# Patient Record
Sex: FEMALE | HISPANIC OR LATINO | URBAN - METROPOLITAN AREA
[De-identification: names, ages, dates, MRNs, and addresses within clinical notes are randomized per-mention and may not be internally consistent; named-entity substitution may affect disease eponyms.]

---

## 2017-08-08 ENCOUNTER — OFFICE VISIT - HEALTHEAST (OUTPATIENT)
Dept: INTERNAL MEDICINE | Facility: CLINIC | Age: 37
End: 2017-08-08

## 2017-08-08 DIAGNOSIS — F41.9 ANXIETY: ICD-10-CM

## 2017-08-08 DIAGNOSIS — M25.60 MULTIPLE STIFF JOINTS: ICD-10-CM

## 2017-08-08 DIAGNOSIS — F43.10 PTSD (POST-TRAUMATIC STRESS DISORDER): ICD-10-CM

## 2017-08-08 DIAGNOSIS — N23 KIDNEY PAIN: ICD-10-CM

## 2017-08-08 DIAGNOSIS — Z86.39 H/O IRON DEFICIENCY: ICD-10-CM

## 2017-08-08 DIAGNOSIS — F32.A DEPRESSION: ICD-10-CM

## 2017-08-10 ENCOUNTER — COMMUNICATION - HEALTHEAST (OUTPATIENT)
Dept: INTERNAL MEDICINE | Facility: CLINIC | Age: 37
End: 2017-08-10

## 2017-08-10 ENCOUNTER — COMMUNICATION - HEALTHEAST (OUTPATIENT)
Dept: INFUSION THERAPY | Facility: HOSPITAL | Age: 37
End: 2017-08-10

## 2017-08-10 ENCOUNTER — AMBULATORY - HEALTHEAST (OUTPATIENT)
Dept: INTERNAL MEDICINE | Facility: CLINIC | Age: 37
End: 2017-08-10

## 2017-08-10 DIAGNOSIS — D50.9 IRON DEFICIENCY ANEMIA: ICD-10-CM

## 2017-08-10 DIAGNOSIS — D50.9 IRON DEFICIENCY ANEMIA, UNSPECIFIED IRON DEFICIENCY ANEMIA TYPE: ICD-10-CM

## 2017-08-10 LAB — ANA SER QL: 0.2 U

## 2017-08-15 ENCOUNTER — COMMUNICATION - HEALTHEAST (OUTPATIENT)
Dept: INFUSION THERAPY | Facility: HOSPITAL | Age: 37
End: 2017-08-15

## 2017-12-27 ENCOUNTER — COMMUNICATION - HEALTHEAST (OUTPATIENT)
Dept: INTERNAL MEDICINE | Facility: CLINIC | Age: 37
End: 2017-12-27

## 2018-01-26 ENCOUNTER — COMMUNICATION - HEALTHEAST (OUTPATIENT)
Dept: SCHEDULING | Facility: CLINIC | Age: 38
End: 2018-01-26

## 2018-02-07 ENCOUNTER — RECORDS - HEALTHEAST (OUTPATIENT)
Dept: ADMINISTRATIVE | Facility: OTHER | Age: 38
End: 2018-02-07

## 2019-04-09 ENCOUNTER — OFFICE VISIT - HEALTHEAST (OUTPATIENT)
Dept: FAMILY MEDICINE | Facility: CLINIC | Age: 39
End: 2019-04-09

## 2019-04-09 DIAGNOSIS — F32.1 MODERATE MAJOR DEPRESSION (H): ICD-10-CM

## 2019-04-09 LAB
ANION GAP SERPL CALCULATED.3IONS-SCNC: 10 MMOL/L (ref 5–18)
BUN SERPL-MCNC: 15 MG/DL (ref 8–22)
CALCIUM SERPL-MCNC: 9.2 MG/DL (ref 8.5–10.5)
CHLORIDE BLD-SCNC: 106 MMOL/L (ref 98–107)
CO2 SERPL-SCNC: 21 MMOL/L (ref 22–31)
CREAT SERPL-MCNC: 0.69 MG/DL (ref 0.6–1.1)
ERYTHROCYTE [DISTWIDTH] IN BLOOD BY AUTOMATED COUNT: 20.7 % (ref 11–14.5)
GFR SERPL CREATININE-BSD FRML MDRD: >60 ML/MIN/1.73M2
GLUCOSE BLD-MCNC: 89 MG/DL (ref 70–125)
HCT VFR BLD AUTO: 34.9 % (ref 35–47)
HGB BLD-MCNC: 10 G/DL (ref 12–16)
MCH RBC QN AUTO: 19.5 PG (ref 27–34)
MCHC RBC AUTO-ENTMCNC: 28.7 G/DL (ref 32–36)
MCV RBC AUTO: 68 FL (ref 80–100)
PLATELET # BLD AUTO: 526 THOU/UL (ref 140–440)
PMV BLD AUTO: 9.7 FL (ref 8.5–12.5)
POTASSIUM BLD-SCNC: 4.6 MMOL/L (ref 3.5–5)
RBC # BLD AUTO: 5.13 MILL/UL (ref 3.8–5.4)
SODIUM SERPL-SCNC: 137 MMOL/L (ref 136–145)
TSH SERPL DL<=0.005 MIU/L-ACNC: 0.74 UIU/ML (ref 0.3–5)
WBC: 5.7 THOU/UL (ref 4–11)

## 2019-04-09 RX ORDER — SERTRALINE HYDROCHLORIDE 25 MG/1
25 TABLET, FILM COATED ORAL DAILY
Qty: 10 TABLET | Refills: 0 | Status: SHIPPED | OUTPATIENT
Start: 2019-04-09

## 2019-04-09 RX ORDER — METHION/INOS/CHOL BT/B COM/LIV 110MG-86MG
1 CAPSULE ORAL DAILY
Qty: 30 TABLET | Refills: 11 | Status: SHIPPED | COMMUNITY
Start: 2019-04-09

## 2019-04-09 RX ORDER — BUSPIRONE HYDROCHLORIDE 5 MG/1
5 TABLET ORAL 2 TIMES DAILY
Qty: 10 TABLET | Refills: 0 | Status: SHIPPED | OUTPATIENT
Start: 2019-04-09

## 2019-04-11 ENCOUNTER — COMMUNICATION - HEALTHEAST (OUTPATIENT)
Dept: FAMILY MEDICINE | Facility: CLINIC | Age: 39
End: 2019-04-11

## 2019-04-11 DIAGNOSIS — D50.9 IRON DEFICIENCY ANEMIA, UNSPECIFIED IRON DEFICIENCY ANEMIA TYPE: ICD-10-CM

## 2019-04-11 RX ORDER — FERROUS SULFATE 325(65) MG
1 TABLET ORAL 2 TIMES DAILY
Qty: 60 TABLET | Refills: 3 | Status: SHIPPED | OUTPATIENT
Start: 2019-04-11

## 2019-04-12 ENCOUNTER — COMMUNICATION - HEALTHEAST (OUTPATIENT)
Dept: FAMILY MEDICINE | Facility: CLINIC | Age: 39
End: 2019-04-12

## 2019-05-24 ENCOUNTER — COMMUNICATION - HEALTHEAST (OUTPATIENT)
Dept: FAMILY MEDICINE | Facility: CLINIC | Age: 39
End: 2019-05-24

## 2021-05-27 NOTE — PROGRESS NOTES
All other testing is normal, normal thyroid and diabetes testing.  Moderate anemia still continues, you should be on iron 325 mg twice daily, please send letter, patient does not answer her phone and has not set up her mailbox or her mailbox is full, please call results to the patient or send a letter if not reachable by phone.

## 2021-05-27 NOTE — PROGRESS NOTES
Assessment/Plan:  Diagnosis:   1. Moderate major depression (H)  Ambulatory referral to Psychiatry    mv,iron,min-FA-dietary cmb.24 400 mcg Tab    HM2(CBC w/o Differential)    Basic Metabolic Panel    Thyroid [place]    sertraline (ZOLOFT) 25 MG tablet    busPIRone (BUSPAR) 5 MG tablet    HM2(CBC w/o Differential)    Basic Metabolic Panel    Thyroid [place]       Discussed [facility] counseling and she has gone to this office but not stayed for intake. She is advised to go or call for a counsellor to come to her home if symptoms become severe. ER if symptoms worsen during treatment. Make a verbal contract with a family member today to take you to the ER if you develop any suicidal thoughts and check in with this person daily about your symptoms. Patient is attended at this visit by her  and she voices assistance in getting the patient to return for follow up on 3/12/19. Giulia VENECIA Mccoy voices willingness to restart Zoloft at 25 mg daily and try Buspar 5 mg two times a day for anxiety. She voices that she would like to start regular psychotherapy and do this with out-patient counseling at [facility]. Close follow up set up and minimal doses given for first week of treatment.            individual therapy and medication  Reviewed concept of depression as biochemical imbalance of neurotransmitters and rationale for treatment.       Subjective:    Patient is a 38 y.o. female who presents for evaluation and treatment of depressive symptoms. Onset of symptoms was gradual starting several years ago with gradually worsening course since that time. Current symptoms include anhedonia, depressed mood and difficulty concentrating. Previous treatment modalities employed include medication and in patient treatment for suicidal depression in the last year.. Depression risk factors include previous episode of depression.  States that she went to the walk-in [facility] crisis clinic in the last  week but did not stay because of the long wait.  She would like to have psychiatry and psychology services.  She has been discharged from counseling and psychiatry for missing appointments, she is also been discharged from [facility].  She is seen today with [facility] .    Depression Screening:  Sex- female  Age- 38 y.o.  Depressed- yes    Sleep- no change  Interest- anhedonia  Guilt- extreme sense of worthlessness, hopelessness and moderate    Energy- poor    Concentration- good  Appetite- no change from normal  Psychomotor- within normal limits  Suicidal- patient admits to thoughts but denies active plan or intent    Past Psychiatric History:   Therapy, Out Patient Abilify, Clonazepam, Zoloft and In Patient psychotherapy.  Currently in treatment with none for 3 years.      Substance Abuse History:  Recreational drugsmethamphetamines  Use of Alcohol: denied  Use of Caffeine: N/A      The following portions of the patient's history were reviewed and updated as appropriate: allergies, current medications, past family history, past medical history, past social history, past surgical history and problem list.    Medical Review Of Systems:  ROS: Patient denies fever, chills, fainting, fatigue, weight change, dizziness, sleep problems, chest pain, palpitations, shortness of breath, wheezing, cough,  sore throat, changes in hearing, ear pain,tinnitus,  disphagia, sore throat, globus, changes in vision, eye pain eye redness, acid reflux, nausea, vomiting, diarrhea, constipation, black or bloody stools,  Dysuria, frequency, urinary incontinence, nocturia, hematuria, back pain,joint pain, bone pain, muscle cramps,edema, weakness, numbness, tingling of extremities, rash, itching, skin changes, swollen lymph nodes, thirst, increased urination, breast lumps, breast pain, nipple discharge, memory difficulties, (female)vaginal discharge, dyspareunia, menorrhagia, pelvic pain, sexual dysfunction,            Objective:    Vital signs:  /78 (Patient Site: Right Arm, Patient Position: Sitting, Cuff Size: Adult Regular)   Pulse 89   Wt 164 lb 8 oz (74.6 kg)   LMP 04/01/2019   SpO2 99%   BMI 30.09 kg/m        Alert, cooperative, well-hydrated.  Appears well.  Eyes: Pupils equal, round, reactive to light.  HEENT: Sclera white, nares patent, MMM   Lungs: Clear to auscultation. No retractions, no increased work of respiration, equal chest rise.   Heart: Regular rate and rhythm, no murmurs, clicks,    Gallops.  Abdomen: Soft, bowel sounds in 4 quadrants with no tenderness to palpation, no organomegaly or masses, no aortic or renal bruits.  Extremities: no tenderness to palpation of gastrocnemius, bilaterally.  Skin: no increased warmth, edema, or erythema of lower legs bilaterally.  Back:  No cervical, thoracic or lumbar tenderness to spinous processes or musculature.  Mood: poor eye contact, affect flat, no pressured speech, no psychomotor agitation, no suicidal or homicidal ideations.  Little interest or pleasure in doing things: Nearly every day  Feeling down, depressed, or hopeless: Nearly every day  Trouble falling or staying asleep, or sleeping too much: Nearly every day  Feeling tired or having little energy: Nearly every day  Poor appetite or overeating: Nearly every day  Feeling bad about yourself - or that you are a failure or have let yourself or your family down: Nearly every day  Trouble concentrating on things, such as reading the newspaper or watching television: Nearly every day  Moving or speaking so slowly that other people could have noticed. Or the opposite - being so fidgety or restless that you have been moving around a lot more than usual: Nearly every day  Thoughts that you would be better off dead, or of hurting yourself in some way: Nearly every day  PHQ-9 Total Score: 27  If you checked off any problems, how difficult have these problems made it for you to do your work, take care  of things at home, or get along with other people?: Extremely dIfficult  How difficult did these problems make it for you to do your work, take care of things at home or get along with other people? : Extremely difficult (4/9/2019 12:00 PM)  Feeling nervous, anxious, or on edge: 3 (4/9/2019 12:00 PM)  Not being able to stop or control worrying: 3 (4/9/2019 12:00 PM)  Worrying too much about different things: 3 (4/9/2019 12:00 PM)  Trouble relaxing: 3 (4/9/2019 12:00 PM)  Being so restless that it's hard to sit still: 3 (4/9/2019 12:00 PM)  Becoming easily annoyed or irritable: 3 (4/9/2019 12:00 PM)  Feeling afraid as if something awful might happen: 3 (4/9/2019 12:00 PM)  ZOILA 7 Total Score: 21 (4/9/2019 12:00 PM)  How difficult did these problems make it for you to do your work, take care of things at home or get along with other people? : Extremely difficult (4/9/2019 12:00 PM)        Mental Status Evaluation:  Appearance:  age appropriate   Behavior:  normal   Speech:  normal volume   Mood:  depressed   Affect:  flat   Thought Process:  normal   Thought Content:  normal   Sensorium:  oriented   Cognition:  grossly intact   Insight:  limited   Judgment:  fair

## 2021-05-27 NOTE — TELEPHONE ENCOUNTER
----- Message from Pamela Handy PA-C sent at 4/11/2019 10:27 AM CDT -----  All other testing is normal, normal thyroid and diabetes testing.  Moderate anemia still continues, you should be on iron 325 mg twice daily, please send letter, patient does not answer her phone and has not set up her mailbox or her mailbox is full, please call results to the patient or send a letter if not reachable by phone.

## 2021-05-27 NOTE — PATIENT INSTRUCTIONS - HE
[facility]  [address]  [place] [address]  [phone]  (also in [place]: [phone])    [facility]  [address] [place] [address]   [phone]      Dr. Madhavi Sosa  [address]   [place] [address]     Tatyana Schoenlnilson  [facility]  [address], [place] [address]  [phone]    [place] [facility]  Psychiatry and counseling services  [facility]  [phone]    [facility]  [address], [address]  [place] [address]  phone: [phone]  Fax: [phone]    [facility]  [address], [address]  [place] [address]  [phone]    Dr. Rusty Costa  Counseling services  [address] [place], [place] [address] [phone]    EDILIA Cunningham, [facility]  Counseling services  [address] [place] [address]   [address]  [place], [place] [address]  [phone]    Howard Breen MA  Counseling services  [facility]  [phone]    ER if symptoms worsen during treatment. Make a verbal contract with a family member today to take you to the ER if you develop any suicidal thoughts and check in with this person daily about your symptoms.

## 2021-05-31 VITALS — WEIGHT: 152.6 LBS | BODY MASS INDEX: 27.91 KG/M2

## 2021-06-03 VITALS — BODY MASS INDEX: 30.09 KG/M2 | WEIGHT: 164.5 LBS
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
## 2021-06-16 PROBLEM — D50.9 IRON DEFICIENCY ANEMIA: Status: ACTIVE | Noted: 2017-08-10

## 2021-06-19 NOTE — LETTER
Letter by Pamela Handy PA-C at      Author: Pamela Handy PA-C Service: -- Author Type: --    Filed:  Encounter Date: 4/12/2019 Status: (Other)         Giulia Mccoy  [address] [place] [address]                04/12/19    Dear  Giulia Mccoy    This letter is in regards to the appointment that you had scheduled on 04/12/2019 at the [facility] with Pamela Handy.     The [facility] strives to see all patients in a timely manner and we need your help to achieve this.  The above-mentioned appointment was missed and we do not have record of a cancellation by you.  Whenever possible, we request appointment cancellations at least 24 hours in advance.  This time allows us to offer the appointment to another patient in need.      If you feel you have received this letter in error, or if you need to reschedule this appointment, please call our office so that we may update our records.      Sincerely,    [facility]

## 2021-06-19 NOTE — LETTER
Letter by Pamela Handy PA-C at      Author: Pamela Handy PA-C Service: -- Author Type: --    Filed:  Encounter Date: 5/24/2019 Status: (Other)         Giulia Mccoy  [address] [place] [address]            05/24/19    Dear  Giulia Mccoy  [id number]    This letter is in regards to the appointment that you had scheduled on 05/23/2019 at the [facility] with Pamela Handy. AMAURI.     The [facility] strives to see all patients in a timely manner and we need your help to achieve this.  The above-mentioned appointment was missed and we do not have record of a cancellation by you.  Whenever possible, we request appointment cancellations at least 24 hours in advance.  This time allows us to offer the appointment to another patient in need.      If you feel you have received this letter in error, or if you need to reschedule this appointment, please call our office so that we may update our records.      Sincerely,    [facility]

## 2021-12-14 ENCOUNTER — MEDICAL CORRESPONDENCE (OUTPATIENT)
Dept: HEALTH INFORMATION MANAGEMENT | Facility: CLINIC | Age: 41
End: 2021-12-14
Payer: MEDICARE

## 2021-12-15 ENCOUNTER — TRANSCRIBE ORDERS (OUTPATIENT)
Dept: OTHER | Age: 41
End: 2021-12-15

## 2021-12-15 DIAGNOSIS — R41.3 MEMORY IMPAIRMENT: Primary | ICD-10-CM

## 2022-02-25 ENCOUNTER — VIRTUAL VISIT (OUTPATIENT)
Dept: NEUROLOGY | Facility: CLINIC | Age: 42
End: 2022-02-25
Attending: PHYSICIAN ASSISTANT
Payer: MEDICARE

## 2022-02-25 DIAGNOSIS — R41.3 MEMORY LOSS: Primary | ICD-10-CM
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
## 2022-02-25 NOTE — PROGRESS NOTES
I call patient to do the rooming but no answer , I left a message for patient to call the clinic back.    Monserrat Villasenor, CMA

## 2022-05-24 ENCOUNTER — TELEPHONE (OUTPATIENT)
Dept: NEUROPSYCHOLOGY | Facility: CLINIC | Age: 42
End: 2022-05-24
Payer: COMMERCIAL

## 2022-05-24 NOTE — TELEPHONE ENCOUNTER
I was scheduled to see Ms. Roman for a neuropsychology exam this afternoon.  She called the clinic this morning to state that she was not feeling well.  We switched her in person visit to a video visit.  When I spoke with her via the video visit, she reported that she had a fever.  She stated that she did not feel well.  She was also coughing when I spoke with her via the video visit.  I told her that I did not think we would be able to obtain valid data from her given that she was feeling poorly.  I had an opening in my schedule for May 31, 2022 at 8 AM.  I offered her the slot, and she accepted.  I will plan to see her at that time via a video visit for the evaluation.    Tor Mustafa, Ph.D., L.P., ABPP  Board Certified in Clinical Neuropsychology   / Licensed Psychologist [id number]

## 2022-05-31 ENCOUNTER — TELEPHONE (OUTPATIENT)
Dept: NEUROPSYCHOLOGY | Facility: CLINIC | Age: 42
End: 2022-05-31
Payer: COMMERCIAL

## 2022-05-31 NOTE — TELEPHONE ENCOUNTER
I was scheduled to see Ms. Mccoy for a video-based neuropsychological evaluation this morning. She had agreed to this appointment time a week ago (please see my note from 05/24/2022). We called her at 7:30 to try to confirm, but did not get an answer. We left her a voicemail. I joined the Zoom meeting at 8:00 and stayed online until 8:30, but she did not join the visit. I also called her again and left her a voicemail at approximately 8:05. I also sent her Zoom link via email again at approximately 8:10.     If she wants to reschedule, she is welcome to reach out to the scheduling team. Ideally, she would be scheduled for an in-person visit.     Tor Mustafa, Ph.D., L.P., ABPP  Board Certified in Clinical Neuropsychology   / Licensed Psychologist [id number]

## 2022-06-21 ENCOUNTER — DOCUMENTATION ONLY (OUTPATIENT)
Dept: NEUROPSYCHOLOGY | Facility: CLINIC | Age: 42
End: 2022-06-21
Payer: COMMERCIAL

## 2022-06-24 ENCOUNTER — VIRTUAL VISIT (OUTPATIENT)
Dept: NEUROLOGY | Facility: CLINIC | Age: 42
End: 2022-06-24
Payer: MEDICARE

## 2022-06-24 DIAGNOSIS — R41.3 MEMORY LOSS: Primary | ICD-10-CM

## 2022-06-24 RX ORDER — ALPRAZOLAM 1 MG
1 TABLET ORAL 3 TIMES DAILY PRN
COMMUNITY

## 2022-06-24 RX ORDER — DIVALPROEX SODIUM 500 MG/1
1 TABLET, DELAYED RELEASE ORAL EVERY 24 HOURS
COMMUNITY

## 2022-06-24 RX ORDER — HYDROXYZINE HYDROCHLORIDE 10 MG/1
10 TABLET, FILM COATED ORAL 3 TIMES DAILY PRN
COMMUNITY

## 2022-06-24 ASSESSMENT — PATIENT HEALTH QUESTIONNAIRE - PHQ9: SUM OF ALL RESPONSES TO PHQ QUESTIONS 1-9: 17

## 2022-06-24 NOTE — LETTER
6/24/2022     RE: Giulia Mccoy  [address] [place] [address]     Dear Colleague,    Thank you for referring your patient, Giulia Mccoy, to the [facility] NEUROSPECIALTIES at [facility]. Please see a copy of my visit note below.    Giulia is a 41 year old who is being evaluated via a billable video visit.      How would you like to obtain your AVS? Mail a copy  If the video visit is dropped, the invitation should be resent by: Text to cell phone: Call [phone]  Will anyone else be joining your video visit? No      Depression Response    Patient completed the PHQ-9 assessment for depression and scored >9? Yes  Question 9 on the PHQ-9 was positive for suicidality? No  Does patient have current mental health provider? No    Is this a virtual visit? Yes   Does patient have suicidal ideation (positive question 9)? No - offer to place Mental Health Referral.  Patient declined referral/not needed       Video-Visit Details    Video Start Time: 11:45AM    Type of service:  Video Visit    Video End Time:12:15PM    Originating Location (pt. Location): Home    Distant Location (provider location):  [facility] NEUROSPECIALTIES     Platform used for Video Visit: [facility]     Memory Disorders Clinic Follow Up Visit Note    Ms. Mccoy is a 41 year old female with history of bipolar, PTSD, anxiety, depression, is here for follow up for evaluation of memory problems. She presented to the appointment by herself.     She reports that her short-term memory is terrible.  She is under a lot of stress lately.  Her daughter is pregnant and lives with her boyfriend; however, she is loosing weight because she is not eating. She missed the neuropsychological testing three times and she forgot about the MRI.      MS: Awake, alert, speech fluent, answering questions appropriately.  CN: No facial asymmetry    A/P:    Ms. Mccoy is a 41 year old female with history of bipolar, PTSD,  anxiety, depression, presenting for evaluation of memory problems.  Her memory problems could be due to multiple etiologies, including psychiatric disorders.  We discussed to obtain further workup such as neuropsychological testing and MRI brain to assess the possibility of neurodegenerative process.  Patient agrees with the workup.     1. Neuropsychological testing.  Contacted Dr. Mustafa to ask for rescheduling.   2. MRI brain  3. Return to clinic after the neuropsychological testing and MRI brain are completed.      I spent 50 minutes total today for this visit, which includes face-to-face with the patient, reviewing the chart (lab reports, clinical notes, medications), and documentation.      Again, thank you for allowing me to participate in the care of your patient.      Sincerely,    Rafaela Jefferson MD

## 2022-06-24 NOTE — PROGRESS NOTES
Giulia is a 41 year old who is being evaluated via a billable video visit.      How would you like to obtain your AVS? Mail a copy  If the video visit is dropped, the invitation should be resent by: Text to cell phone: Call [phone]  Will anyone else be joining your video visit? No      Depression Response    Patient completed the PHQ-9 assessment for depression and scored >9? Yes  Question 9 on the PHQ-9 was positive for suicidality? No  Does patient have current mental health provider? No    Is this a virtual visit? Yes   Does patient have suicidal ideation (positive question 9)? No - offer to place Mental Health Referral.  Patient declined referral/not needed            Video-Visit Details    Video Start Time: 11:45AM    Type of service:  Video Visit    Video End Time:12:15PM    Originating Location (pt. Location): Home    Distant Location (provider location):  [facility] NEUROSPECIALTIES     Platform used for Video Visit: [facility]           Memory Disorders Clinic Follow Up Visit Note    Ms. Mccoy is a 41 year old female with history of bipolar, PTSD, anxiety, depression, is here for follow up for evaluation of memory problems. She presented to the appointment by herself.     She reports that her short-term memory is terrible.  She is under a lot of stress lately.  Her daughter is pregnant and lives with her boyfriend; however, she is loosing weight because she is not eating. She missed the neuropsychological testing three times and she forgot about the MRI.      MS: Awake, alert, speech fluent, answering questions appropriately.  CN: No facial asymmetry    A/P:    Ms. Mccoy is a 41 year old female with history of bipolar, PTSD, anxiety, depression, presenting for evaluation of memory problems.  Her memory problems could be due to multiple etiologies, including psychiatric disorders.  We discussed to obtain further workup such as neuropsychological testing and MRI brain to assess the possibility of  neurodegenerative process.  Patient agrees with the workup.     1. Neuropsychological testing.  Contacted Dr. Mustafa to ask for rescheduling.   2. MRI brain  3. Return to clinic after the neuropsychological testing and MRI brain are completed.        I spent 50 minutes total today for this visit, which includes face-to-face with the patient, reviewing the chart (lab reports, clinical notes, medications), and documentation.

## 2022-06-24 NOTE — PATIENT INSTRUCTIONS
Neuropsychological testing  MRI brain  Return to clinic after the neuropsychological testing and MRI brain are completed.

## 2022-06-27 ENCOUNTER — TELEPHONE (OUTPATIENT)
Dept: NEUROLOGY | Facility: CLINIC | Age: 42
End: 2022-06-27

## 2022-06-27 NOTE — TELEPHONE ENCOUNTER
SOFIEM for check out to schedule return appt w Dr. Jefferson, follow up in 4 months from last appt, provided clinic number for call back.

## 2022-08-07 ENCOUNTER — HOSPITAL ENCOUNTER (OUTPATIENT)
Dept: MRI IMAGING | Facility: HOSPITAL | Age: 42
Discharge: HOME | End: 2022-08-07
Attending: PSYCHIATRY & NEUROLOGY | Admitting: PSYCHIATRY & NEUROLOGY
Payer: MEDICARE

## 2022-08-07 PROCEDURE — G1004 CDSM NDSC: HCPCS

## 2022-09-21 ENCOUNTER — OFFICE VISIT (OUTPATIENT)
Dept: NEUROLOGY | Facility: CLINIC | Age: 42
End: 2022-09-21
Attending: PSYCHIATRY & NEUROLOGY

## 2022-09-21 DIAGNOSIS — F06.8 OTHER SPECIFIED MENTAL DISORDERS DUE TO KNOWN PHYSIOLOGICAL CONDITION: Primary | ICD-10-CM

## 2022-09-21 DIAGNOSIS — F33.2 SEVERE RECURRENT MAJOR DEPRESSION WITHOUT PSYCHOTIC FEATURES (H): ICD-10-CM

## 2022-09-21 DIAGNOSIS — F41.9 ANXIETY: ICD-10-CM
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
## 2022-09-21 NOTE — PROGRESS NOTES
Patient was seen for neuropsychological evaluation at the request of Dr. Rafaela Jefferson, for the purposes of diagnostic clarification and treatment planning.  2 hrs 13 min of test administration and scoring were provided by this writer, Sujatha Lara.  Please see Dr. Tor Mustafa's report for a full interpretation of the findings.

## 2022-09-22 NOTE — PROGRESS NOTES
Name: Giulia Mccoy MRN: [id number]  : 1980 WREN: 2022  Staff: TURNER Tech: JONATHAN Age: 41  Sex: Female Hand: Right Educ: 10 +GED + MA Degree  Vision: 20/30 ?with correction / ?without correction    ORIENTATION     Time  -6     Place  0 /2     Personal info     3 /4    WAIS-IV     WMI: 74        Raw SSa     Similarities  21 8     Block Design  12 4     Digit Span  20 6 RDS= 7     Arithmetic  8 5     Coding  74 11    HVLT-R Form 1     Trial 1 2 3 Total      7 10 9  26  Raw T     Total Learning (1-3) 26 45     Delayed Recall  9 44     Percent Retention 90 48     Recognition Hits/FP 11/ 39      BVMT-R Form 1     Trial 1 2 3 Total      0 3 7  10  Raw T / %ile     Total Learning (1-3) 10 <20     Delayed Recall  4 <20     Percent Retention 57 <1st     Recognition Hits/FP 5/ 3rd-5th    VICKI-O    Raw    T %ile     Copy    29.0     ?1     Time to Copy  132        WRAT5   SS %ile Grade Equiv.     Word Reading  76 5 5.7    COWAT (FAS)   Raw: 26   T: 37    Animals   Raw:  20  T-score:  47    BOSTON NAMING TEST   Raw: 43   T: 29     COMPLEX IDEATIONAL MATERIAL   Raw:  T: 46    TRAILS  Raw  Err T    A 35  2 43   B 98  3 41    LUDWIN Short   Raw: 4/15 %ile: 1.5    BDI-II  Raw:  50  Interpretation: Severe    BENY  Raw:  46  Interpretation: Severe    TOMM T1: 25  T2: 25 T3: 25    DCT E-score: 26    MMPI-3     RCd: 80 CRIN:  48     RC1: 80 VRIN:  47     RC2: 86 CINDI:  60T     RC4: 50 F:  120     RC6: 83 Fp:  76     RC7: 78 FS:  86     RC8: 75 FBS:  89     RC9: 62 RBS:  98    L:  65    K:  38
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
## 2022-09-30 ENCOUNTER — VIRTUAL VISIT (OUTPATIENT)
Dept: NEUROLOGY | Facility: CLINIC | Age: 42
End: 2022-09-30
Payer: MEDICARE

## 2022-09-30 DIAGNOSIS — R41.3 MEMORY LOSS: Primary | ICD-10-CM

## 2022-09-30 DIAGNOSIS — F33.2 SEVERE RECURRENT MAJOR DEPRESSION WITHOUT PSYCHOTIC FEATURES (H): ICD-10-CM

## 2022-09-30 NOTE — LETTER
9/30/2022     RE: Giulia Mccoy  [address] [place] [address]     Dear Colleague,    Thank you for referring your patient, Giulia Mccoy, to the [facility]. Please see a copy of my visit note below.    Chief Complaint: memory problems     History of Present Illness:  Ms. Mccoy is a 42 year old female with history of bipolar, PTSD, anxiety, depression,her for follow up for memory problems.     Ms. Mccoy reports that she has poor sleep.  She does not snore or become apneic.  She tried very hard to remember the appointments for the memory clinic.        General Physical examination:  There were no vitals taken for this visit.     General: Ms. Mccoy is cooperative.    Neurological examination:    Mental status: Ms. Mccoy  is awake, alert, and appropriate, with fluent speech, no word finding difficulties and no difficulty in answering questions. .    Neuropsychological evaluation:  A full report of the neuropsychological battery testing is available separately. Findings are summarized here briefly.  Dr. Mustafa 9/21/2022  IMPRESSIONS  There was objective evidence to suggest that Ms. Mccoy did not or was unable to provide full and consistent engagement with testing in this exam.  There are two leading explanations for inconsistent engagement.  One possible explanation would be that non-neurologic factors such as depression, anxiety, and poor sleep produced variability in her focus during testing.  Indeed, she is reporting severe symptoms of depression and anxiety, as well as considerable psychological distress on self-report measures.  Additionally, she described having considerable difficulties with her sleep.  A second explanation would be a willful attempt at doing poorly on tests.  In the end, I can state that she produced normal (low average to average range) performances on measures of verbal reasoning,  auditory attention, speeded visual processing, word retrieval, verbal comprehension, and verbal learning and recall.  The remainder of her scores in this exam were low, likely due to the inconsistent focus on testing.  I would predict a reduction in her subjective concerns about cognitive dysfunction following improved management of her mental health, as well as improvements in her sleep.    Labs:  Lab Results   Component Value Date    WBC 5.7 04/09/2019    RBC 5.13 04/09/2019    HGB 10.0 (L) 04/09/2019    HCT 34.9 (L) 04/09/2019    MCV 68 (L) 04/09/2019    MCH 19.5 (L) 04/09/2019    MCHC 28.7 (L) 04/09/2019    RDW 20.7 (H) 04/09/2019     (H) 04/09/2019     04/09/2019    POTASSIUM 4.6 04/09/2019    CHLORIDE 106 04/09/2019    CO2 21 (L) 04/09/2019    ANIONGAP 10 04/09/2019    GLC 89 04/09/2019    BUN 15 04/09/2019    CR 0.69 04/09/2019    GUILLAUME 9.2 04/09/2019    TSH 0.74 04/09/2019        Imaging:  Results for orders placed or performed in visit on 02/25/22   MR Brain w/o Contrast    Narrative    EXAM: MR BRAIN W/O CONTRAST  LOCATION: [facility]  DATE/TIME: 8/7/2022 11:38 AM    INDICATION: Confusion. Memory loss.  COMPARISON: None available at time of dictation  TECHNIQUE: Routine multiplanar multisequence head MRI without intravenous contrast according to the dementia protocol.    FINDINGS:  INTRACRANIAL CONTENTS: No acute or subacute infarct. No mass, acute hemorrhage, or extra-axial fluid collections. Probable enlarged perivascular space in the right parietal corona radiata, otherwise the brain parenchyma is unremarkable. Normal ventricles   and sulci. Normal position of the cerebellar tonsils.     SELLA: No abnormality accounting for technique.    OSSEOUS STRUCTURES/SOFT TISSUES: Normal marrow signal. The major intracranial vascular flow voids are maintained.     ORBITS: No abnormality accounting for technique.     SINUSES/MASTOIDS: No paranasal sinus mucosal disease. No middle  ear or mastoid effusion.       Impression    IMPRESSION:    1.  Unremarkable brain.           Impression:  Ms. Mccoy is a 42 year old female with history of bipolar, PTSD, anxiety, depression, presenting for evaluation of memory problems.  Her memory problems could be due to multiple etiologies, including psychiatric disorders.  Her MRI brain is normal.  Her neuropsychological testing did not reveal neurodegenerative disease.      Recommendations:  1.  Exercise 30 minute/day as tolerated.    2.  Improvement of mood disorders and sleep.  3.  Follow up with PCP.  4.  No follow up in [facility].  Please re-refer with new questions.    I spent 60 minutes total today for this visit, which includes face-to-face with the patient, reviewing the chart (neuropsychological testing, MRI images, lab reports, clinical notes, medications), counseling, and documentation.      Giulia is a 42 year old who is being evaluated via a billable telephone visit.      What phone number would you like to be contacted at? [phone]  How would you like to obtain your AVS? Mail a copy  Phone call duration: 15 minutes    Again, thank you for allowing me to participate in the care of your patient.      Sincerely,    Rafaela Jefferson MD

## 2022-09-30 NOTE — PROGRESS NOTES
Chief Complaint: memory problems     History of Present Illness:  Ms. Mccoy is a 42 year old female with history of bipolar, PTSD, anxiety, depression,her for follow up for memory problems.     Ms. Mccoy reports that she has poor sleep.  She does not snore or become apneic.  She tried very hard to remember the appointments for the memory clinic.        General Physical examination:  There were no vitals taken for this visit.     General: Ms. Mccoy is cooperative.    Neurological examination:    Mental status: Ms. Mccoy  is awake, alert, and appropriate, with fluent speech, no word finding difficulties and no difficulty in answering questions. .    Neuropsychological evaluation:  A full report of the neuropsychological battery testing is available separately. Findings are summarized here briefly.  Dr. Mustafa 9/21/2022  IMPRESSIONS  There was objective evidence to suggest that Ms. Mccoy did not or was unable to provide full and consistent engagement with testing in this exam.  There are two leading explanations for inconsistent engagement.  One possible explanation would be that non-neurologic factors such as depression, anxiety, and poor sleep produced variability in her focus during testing.  Indeed, she is reporting severe symptoms of depression and anxiety, as well as considerable psychological distress on self-report measures.  Additionally, she described having considerable difficulties with her sleep.  A second explanation would be a willful attempt at doing poorly on tests.  In the end, I can state that she produced normal (low average to average range) performances on measures of verbal reasoning, auditory attention, speeded visual processing, word retrieval, verbal comprehension, and verbal learning and recall.  The remainder of her scores in this exam were low, likely due to the inconsistent focus on testing.  I would predict a reduction in her subjective concerns about cognitive dysfunction  following improved management of her mental health, as well as improvements in her sleep.    Labs:  Lab Results   Component Value Date    WBC 5.7 04/09/2019    RBC 5.13 04/09/2019    HGB 10.0 (L) 04/09/2019    HCT 34.9 (L) 04/09/2019    MCV 68 (L) 04/09/2019    MCH 19.5 (L) 04/09/2019    MCHC 28.7 (L) 04/09/2019    RDW 20.7 (H) 04/09/2019     (H) 04/09/2019     04/09/2019    POTASSIUM 4.6 04/09/2019    CHLORIDE 106 04/09/2019    CO2 21 (L) 04/09/2019    ANIONGAP 10 04/09/2019    GLC 89 04/09/2019    BUN 15 04/09/2019    CR 0.69 04/09/2019    GUILLAUME 9.2 04/09/2019    TSH 0.74 04/09/2019        Imaging:  Results for orders placed or performed in visit on 02/25/22   MR Brain w/o Contrast    Narrative    EXAM: MR BRAIN W/O CONTRAST  LOCATION: [facility]  DATE/TIME: 8/7/2022 11:38 AM    INDICATION: Confusion. Memory loss.  COMPARISON: None available at time of dictation  TECHNIQUE: Routine multiplanar multisequence head MRI without intravenous contrast according to the dementia protocol.    FINDINGS:  INTRACRANIAL CONTENTS: No acute or subacute infarct. No mass, acute hemorrhage, or extra-axial fluid collections. Probable enlarged perivascular space in the right parietal corona radiata, otherwise the brain parenchyma is unremarkable. Normal ventricles   and sulci. Normal position of the cerebellar tonsils.     SELLA: No abnormality accounting for technique.    OSSEOUS STRUCTURES/SOFT TISSUES: Normal marrow signal. The major intracranial vascular flow voids are maintained.     ORBITS: No abnormality accounting for technique.     SINUSES/MASTOIDS: No paranasal sinus mucosal disease. No middle ear or mastoid effusion.       Impression    IMPRESSION:    1.  Unremarkable brain.           Impression:  Ms. Mccoy is a 42 year old female with history of bipolar, PTSD, anxiety, depression, presenting for evaluation of memory problems.  Her memory problems could be due to multiple etiologies,  including psychiatric disorders.  Her MRI brain is normal.  Her neuropsychological testing did not reveal neurodegenerative disease.      Recommendations:  1.  Exercise 30 minute/day as tolerated.    2.  Improvement of mood disorders and sleep.  3.  Follow up with PCP.  4.  No follow up in [facility].  Please re-refer with new questions.    I spent 60 minutes total today for this visit, which includes face-to-face with the patient, reviewing the chart (neuropsychological testing, MRI images, lab reports, clinical notes, medications), counseling, and documentation.

## 2022-09-30 NOTE — PROGRESS NOTES
Giulia is a 42 year old who is being evaluated via a billable telephone visit.      What phone number would you like to be contacted at? [phone]  How would you like to obtain your AVS? Mail a copy  Phone call duration: 15 minutes

## 2023-10-24 ENCOUNTER — OFFICE VISIT (OUTPATIENT)
Dept: FAMILY MEDICINE | Facility: CLINIC | Age: 43
End: 2023-10-24
Payer: COMMERCIAL

## 2023-10-24 VITALS
WEIGHT: 170 LBS | RESPIRATION RATE: 22 BRPM | BODY MASS INDEX: 31.09 KG/M2 | SYSTOLIC BLOOD PRESSURE: 129 MMHG | DIASTOLIC BLOOD PRESSURE: 86 MMHG | HEART RATE: 103 BPM | OXYGEN SATURATION: 98 % | TEMPERATURE: 98.2 F

## 2023-10-24 DIAGNOSIS — J06.9 VIRAL URI WITH COUGH: Primary | ICD-10-CM

## 2023-10-24 PROCEDURE — 99213 OFFICE O/P EST LOW 20 MIN: CPT | Performed by: NURSE PRACTITIONER

## 2023-10-24 PROCEDURE — 87635 SARS-COV-2 COVID-19 AMP PRB: CPT | Performed by: NURSE PRACTITIONER

## 2023-10-24 RX ORDER — VENLAFAXINE HYDROCHLORIDE 75 MG/1
75 CAPSULE, EXTENDED RELEASE ORAL
COMMUNITY
Start: 2023-07-23

## 2023-10-24 RX ORDER — FLUOXETINE 10 MG/1
10 CAPSULE ORAL DAILY
COMMUNITY
Start: 2023-08-23

## 2023-10-24 RX ORDER — GUAIFENESIN/DEXTROMETHORPHAN 100-10MG/5
10 SYRUP ORAL EVERY 4 HOURS PRN
Qty: 236 ML | Refills: 0 | Status: SHIPPED | OUTPATIENT
Start: 2023-10-24

## 2023-10-24 ASSESSMENT — ENCOUNTER SYMPTOMS
DIAPHORESIS: 0
COUGH: 1
CHILLS: 0
WHEEZING: 1
SORE THROAT: 1
FEVER: 0

## 2023-10-24 NOTE — PATIENT INSTRUCTIONS
Cough medication as needed for cough or congestion.  Rest.  Push fluids.  Tylenol or ibuprofen as needed for discomfort such as throat pain.    We will call you if your COVID test is positive only most likely later tonight.    Come back for fevers over 100.4, shortness of breath or not starting to feel better in about 10 days.     No wheezing heard today.

## 2023-10-24 NOTE — PROGRESS NOTES
Assessment & Plan     Viral URI with cough    - guaiFENesin-dextromethorphan (ROBITUSSIN DM) 100-10 MG/5ML syrup  Dispense: 236 mL; Refill: 0  - Symptomatic COVID-19 Virus (Coronavirus) by PCR Nose       Focused exam and history done due to COVID-19 pandemic in a walk-in setting.      History, exam, and vital signs consistent with a viral URI.  Stated she felt wheezy.  Patient has no wheezing on exam and no history of asthma.    No red flags.     Isolate pending covid results.      Recheck if shortness of breath or new fevers develop.  Rest.     OTCs recommended: None [   ].  Dextromethorphan  [ x ], guaifenesin [ x], pseudoephedrine [   ], Afrin for no greater than 3 days [  ], Tylenol or ibuprofen [ x ].                Return in about 10 days (around 11/3/2023) for If no better.    [facility]    Shari Platt is a 43 year old female who presents to clinic today for the following health issues:  Chief Complaint   Patient presents with    Cough     Cough, wheezing, chest pain. Really bad when laying down. Raspy sounding. No fevers that she knows of. Body aches. Sore throat. Productive cough. Headache. Fatigue. SOB.      HPI    Cough, congestion, sore throat and body aches.  Sore throat is improving over the last 2 days.  Feeling very tired.  Has not tried any medications for this including cough medication.  Is only drinking green tea.    Patient is vehemently opposed to anything related to COVID including COVID testing, but does agree eventually to get a COVID test.  Has not been vaccinated for COVID.    Symptoms started 3 days ago.    Lives with a large family.  Some family members are sick, no known COVID exposures.      Review of Systems   Constitutional:  Negative for chills, diaphoresis and fever.   HENT:  Positive for congestion and sore throat.    Respiratory:  Positive for cough and wheezing.            Objective    /86 (BP Location: Right arm,  Patient Position: Sitting, Cuff Size: Adult Regular)   Pulse 103   Temp 98.2  F (36.8  C) (Oral)   Resp 22   Wt 77.1 kg (170 lb)   LMP  (LMP Unknown)   SpO2 98%   Breastfeeding No   BMI 31.09 kg/m    Physical Exam  Constitutional:       General: She is not in acute distress.     Appearance: She is well-developed.   HENT:      Nose: Congestion present.      Mouth/Throat:      Pharynx: Posterior oropharyngeal erythema present.      Tonsils: No tonsillar exudate. 1+ on the right. 0 on the left.   Eyes:      General:         Right eye: No discharge.         Left eye: No discharge.      Conjunctiva/sclera: Conjunctivae normal.   Cardiovascular:      Rate and Rhythm: Normal rate and regular rhythm.      Pulses: Normal pulses.      Heart sounds: Normal heart sounds.   Pulmonary:      Effort: Pulmonary effort is normal.      Breath sounds: Normal breath sounds. No wheezing.   Musculoskeletal:         General: Normal range of motion.   Lymphadenopathy:      Cervical: Cervical adenopathy present.   Skin:     General: Skin is warm and dry.      Capillary Refill: Capillary refill takes less than 2 seconds.   Neurological:      Mental Status: She is alert and oriented to person, place, and time.   Psychiatric:         Mood and Affect: Mood normal.         Behavior: Behavior normal.         Thought Content: Thought content normal.         Judgment: Judgment normal.

## 2023-10-25 LAB — SARS-COV-2 RNA RESP QL NAA+PROBE: NEGATIVE

## 2025-03-02 ENCOUNTER — HOSPITAL ENCOUNTER (EMERGENCY)
Facility: HOSPITAL | Age: 45
Discharge: HOME | End: 2025-03-02
Attending: EMERGENCY MEDICINE | Admitting: EMERGENCY MEDICINE
Payer: COMMERCIAL

## 2025-03-02 VITALS
BODY MASS INDEX: 36.84 KG/M2 | WEIGHT: 200.2 LBS | TEMPERATURE: 97.1 F | RESPIRATION RATE: 18 BRPM | HEART RATE: 71 BPM | DIASTOLIC BLOOD PRESSURE: 63 MMHG | OXYGEN SATURATION: 99 % | SYSTOLIC BLOOD PRESSURE: 113 MMHG | HEIGHT: 62 IN

## 2025-03-02 DIAGNOSIS — F41.9 ANXIETY: ICD-10-CM

## 2025-03-02 PROCEDURE — 99284 EMERGENCY DEPT VISIT MOD MDM: CPT

## 2025-03-02 PROCEDURE — 250N000013 HC RX MED GY IP 250 OP 250 PS 637: Performed by: EMERGENCY MEDICINE

## 2025-03-02 RX ORDER — ALPRAZOLAM 0.5 MG
1 TABLET ORAL ONCE
Status: COMPLETED | OUTPATIENT
Start: 2025-03-02 | End: 2025-03-02

## 2025-03-02 RX ORDER — ALPRAZOLAM 1 MG/1
1 TABLET ORAL 3 TIMES DAILY PRN
Qty: 9 TABLET | Refills: 0 | Status: SHIPPED | OUTPATIENT
Start: 2025-03-02 | End: 2025-03-05

## 2025-03-02 RX ORDER — HYDROXYZINE HYDROCHLORIDE 10 MG/1
10 TABLET, FILM COATED ORAL 3 TIMES DAILY PRN
Qty: 30 TABLET | Refills: 0 | Status: SHIPPED | OUTPATIENT
Start: 2025-03-02 | End: 2025-03-12

## 2025-03-02 RX ADMIN — ALPRAZOLAM 1 MG: 0.5 TABLET ORAL at 17:42

## 2025-03-02 ASSESSMENT — COLUMBIA-SUICIDE SEVERITY RATING SCALE - C-SSRS
4. HAVE YOU HAD THESE THOUGHTS AND HAD SOME INTENTION OF ACTING ON THEM?: YES
5. HAVE YOU STARTED TO WORK OUT OR WORKED OUT THE DETAILS OF HOW TO KILL YOURSELF? DO YOU INTEND TO CARRY OUT THIS PLAN?: NO
3. HAVE YOU BEEN THINKING ABOUT HOW YOU MIGHT KILL YOURSELF?: NO
2. HAVE YOU ACTUALLY HAD ANY THOUGHTS OF KILLING YOURSELF IN THE PAST MONTH?: YES
1. IN THE PAST MONTH, HAVE YOU WISHED YOU WERE DEAD OR WISHED YOU COULD GO TO SLEEP AND NOT WAKE UP?: YES
6. HAVE YOU EVER DONE ANYTHING, STARTED TO DO ANYTHING, OR PREPARED TO DO ANYTHING TO END YOUR LIFE?: NO

## 2025-03-02 ASSESSMENT — ACTIVITIES OF DAILY LIVING (ADL)
ADLS_ACUITY_SCORE: 41
ADLS_ACUITY_SCORE: 41

## 2025-03-02 NOTE — ED PROVIDER NOTES
"EMERGENCY DEPARTMENT NOTE     Name: Giulia Mccoy    Age/Sex: 44 year old female   MRN: [id number]   Evaluation Date & Time:  3/2/2025  4:26 PM    PCP:    Geraldine Ref-Primary, Physician   ED Provider: Adam Samayoa D.O.       CHIEF COMPLAINT    Anxiety     HISTORY OF PRESENT ILLNESS   Giuila Mccoy is a 44 year old year old female with a relevant past history of depression, anxiety, PTSD, who presents to the ED  for evaluation of anxiety.      DIAGNOSIS & DISPOSITION/MEDICAL DECISION MAKING   No diagnosis found.    EMERGENCY DEPARTMENT COURSE   4:57 PM I met with the patient to gather history and to perform my initial exam.  We discussed treatment options and the plan for care while in the Emergency Department.  Triage vital signs:/56   Pulse 73   Temp 97.1  F (36.2  C) (Tympanic)   Resp 18   Ht 1.575 m (5' 2\")   Wt 90.8 kg (200 lb 3.2 oz)   SpO2 100%   BMI 36.62 kg/m       MDM: Patient was given alprazolam with improvement in anxiety.  Patient will be discharged with short course of alprazolam until she can get her prescription refilled as well as hydroxyzine.  She will contact her psychiatrist on Monday to discuss if she needs to be seen earlier than scheduled appointment.  Return criteria discussed and if the patient has recurrent anxiety not improved with antianxiety medications or is feeling overwhelmed or develops any thoughts of self-harm will return to the emergency department.     Discharge Vital Signs:/68   Pulse 75   Temp 97.1  F (36.2  C) (Tympanic)   Resp 18   Ht 1.575 m (5' 2\")   Wt 90.8 kg (200 lb 3.2 oz)   SpO2 98%   BMI 36.62 kg/m     PROCEDURES:   None  Diagnostic studies:  No orders to display     Labs Ordered and Resulted from Time of ED Arrival to Time of ED Departure - No data to display  ED INTERVENTIONS     Medications   ALPRAZolam (XANAX) tablet 1 mg (has no administration in time range)     TOTAL CRITICAL CARE TIME (EXCLUDING PROCEDURES): Not " applicable      DISCHARGE MEDICATIONS        Review of your medicines        UNREVIEWED medicines. Ask your doctor about these medicines        Dose / Directions   ALPRAZolam 1 MG tablet  Commonly known as: XANAX      Dose: 1 mg  Take 1 mg by mouth 3 times daily as needed for anxiety  Refills: 0     busPIRone 5 MG tablet  Commonly known as: BUSPAR  Used for: Moderate major depression (H)      Dose: 5 mg  [BUSPIRONE (BUSPAR) 5 MG TABLET] Take 1 tablet (5 mg total) by mouth 2 (two) times a day.  Quantity: 10 tablet  Refills: 0     divalproex sodium delayed-release 500 MG DR tablet  Commonly known as: DEPAKOTE      Dose: 1 tablet  Take 1 tablet by mouth every 24 hours  Refills: 0     ferrous sulfate 325 (65 Fe) MG tablet  Commonly known as: FEROSUL  Used for: Iron deficiency anemia, unspecified iron deficiency anemia type      Dose: 1 tablet  [FERROUS SULFATE 325 (65 FE) MG TABLET] Take 1 tablet (325 mg total) by mouth 2 (two) times a day.  Quantity: 60 tablet  Refills: 3     FLUoxetine 10 MG capsule  Commonly known as: PROzac      Dose: 10 mg  Take 10 mg by mouth daily  Refills: 0     guaiFENesin-dextromethorphan 100-10 MG/5ML syrup  Commonly known as: ROBITUSSIN DM  Used for: Viral URI with cough      Dose: 10 mL  Take 10 mLs by mouth every 4 hours as needed for cough or congestion  Quantity: 236 mL  Refills: 0     hydrOXYzine HCl 10 MG tablet  Commonly known as: ATARAX      Dose: 10 mg  Take 10 mg by mouth 3 times daily as needed  Refills: 0     sertraline 25 MG tablet  Commonly known as: ZOLOFT  Used for: Moderate major depression (H)      Dose: 25 mg  [SERTRALINE (ZOLOFT) 25 MG TABLET] Take 1 tablet (25 mg total) by mouth daily.  Quantity: 10 tablet  Refills: 0     Super Multiple Tabs  Used for: Moderate major depression (H)      Dose: 1 tablet  [MV,IRON,MIN-FA-DIETARY CMB.24 400 MCG TAB] Take 1 tablet by mouth daily.  Quantity: 30 tablet  Refills: 11     venlafaxine 75 MG 24 hr capsule  Commonly known as: EFFEXOR  XR      Dose: 75 mg  Take 75 mg by mouth daily with food  Refills: 0            DISPOSITION: Home    Medical Decision Making    I obtained additional history from these independent historians:  HINA  I reviewed these outside records:  Primary care office visit May 15, 2020 for which she is evaluated for follow-up of TBI and mental health issues  With respect to mental health, she reports that she began dealing with mental health issues at age 12. She states that she did not have mental health care for a long time. She indicates that her medications are the only thing that allow her to function. She indicated that she has good times and bad times. She reported that she has been diagnosed with anxiety, PTSD, and depression. She is under the care of a psychiatrist Radha Zelaya whom she sees every 3 months and is participating in DBT. She states that she had somewhere between 3 and 5 psychiatric hospitalizations, the last of which occurred in 2017 or 2018.     I noted these abnormal vital signs / labs:  HINA    Monitor Strip Interpretation:  HINA  12-Lead ECG Interpretation:  HINA  I independently reviewed the following diagnostic studies:  HINA  I spoke to the following clinicians regard the patients care:  HINA  My disposition decision is based on the following reasons:    Discharge: I considered admission but discharged the patient after current workup and patient's clinical course in the emergency department.  Prescription medications prescribed included alprazolam,hydroxyzine    MIPS Documentation Not Applicable    At the conclusion of the encounter I discussed the results of all of the tests and the disposition. The questions were answered. The patient or family acknowledged understanding and was agreeable with the care plan.            INFORMATION SOURCE AND LIMITATIONS    History/Exam limitations: None  Patient information was obtained from: Patient  Use of : N/A        REVIEW OF SYSTEMS:   All other systems  "reviewed and are negative except as noted above in HPI.    PATIENT HISTORY   No past medical history on file.  Patient Active Problem List   Diagnosis    Pregnant    Pregnancy    Gestational diabetes    Iron deficiency anemia     Past Surgical History:   Procedure Laterality Date     SECTION       SECTION N/A 2014    Procedure:  SECTION REPEAT;  Surgeon: Ciaran Ayala MD;  Location: [facility] OR;  Service:        No Known Allergies    OUTPATIENT MEDICATIONS     New Prescriptions    No medications on file      Vitals:    25 1605 25 1630 25 1645   BP: (!) 160/70 135/73 111/56   Pulse: 89 87 73   Resp: 18     Temp: 97.1  F (36.2  C)     TempSrc: Tympanic     SpO2: 99% 100% 100%   Weight: 90.8 kg (200 lb 3.2 oz)     Height: 1.575 m (5' 2\")         Physical Exam   Constitutional: Oriented to person, place, and time. Appears well-developed and well-nourished.   Cardiovascular: Normal rate, regular rhythm and normal heart sounds.    Pulmonary/Chest: Normal effort  and breath sounds normal.   Skin: Skin is warm and dry.   Psychiatric: Anxious mood and affect. Behavior is normal. Thought content normal.     DIAGNOSTICS    LABORATORY FINDINGS (REVIEWED AND INTERPRETED):  None            Adam Samayoa D.O.  EMERGENCY MEDICINE   [address]  [facility] EMERGENCY DEPARTMENT  [facility] [facility] [address]  [phone]  Dept: [phone]     Adam Samayoa DO  25 [id number]    "

## 2025-03-02 NOTE — ED NOTES
"Patient presents to the clinic for medicare wellness.      Chief Complaint   Patient presents with     Medicare Visit     wellness       Initial /78 (BP Location: Right arm, Patient Position: Sitting, Cuff Size: Adult Regular)   Pulse 64   Temp 96.9  F (36.1  C) (Temporal)   Resp 20   Ht 1.721 m (5' 7.75\")   Wt 101.2 kg (223 lb)   SpO2 96%   BMI 34.16 kg/m   Estimated body mass index is 34.16 kg/m  as calculated from the following:    Height as of this encounter: 1.721 m (5' 7.75\").    Weight as of this encounter: 101.2 kg (223 lb).  Medication Reconciliation: complete    Yuridia Rosenthal LPN        " Patient stated she ran out of her medications a few days ago. Reports being under a lot of stress and doesn't feel she will be able to manage without them until when she receives them on Wednesday. When asked about SI, patient does state she has thoughts of death but denies any plan and stated she would not kill herself.

## 2025-03-02 NOTE — ED TRIAGE NOTES
Patient comes in due to feeling anxious and overwhelmed. Has upper back pain.   Out of anti-anxiety medications. Had suicidal thoughts last night. No plan.   Endorsees a lot of stress at home.

## 2025-03-03 NOTE — DISCHARGE INSTRUCTIONS
Continue hydroxyzine and alprazolam as previously prescribed.  Contact your psychiatrist on Monday to discuss follow-up.  Return to the emergency department if recurrent anxiety not improved with medications